# Patient Record
Sex: FEMALE | URBAN - METROPOLITAN AREA
[De-identification: names, ages, dates, MRNs, and addresses within clinical notes are randomized per-mention and may not be internally consistent; named-entity substitution may affect disease eponyms.]

---
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
## 2025-06-13 NOTE — PROCEDURE: MEDICATION COUNSELING
Thanks for visiting us today!    Remember these important phone numbers:    [phone] for phone nurses during the day and our nurse answering service at night    [phone]   for scheduling or changing future appointments    [phone] for the [facility]    When leaving a message for our staff, please include:   the spelling of your child’s full name and date of birth  your full name and relationship to child  best phone number and time to reach you   reason for the call      We strongly recommend that all children age 6 months and older receive the COVID-19 vaccine. Call our office at [phone]  to schedule.      Where's the best place on the internet to look up health information about kids?  HealthyChildren.org  From the [employer]        Is your child signed up for [employer]? If you do this, you can message us rather than playing phone tag! You can also look at labs, pay your bills, and do some scheduling. Go to your own account first. (If you don't have one yet, you can set one up at the website below or at your doctor's office).  Using a web browser (not the phone krissy), on the right hand side of your page, click the button marked \"Request Access to my Child's Records.\" Fill out the information. In a few days your child's information will be linked to your account. It's that simple!! Here is the website for more information:    [email or web address]        --------------------------------------------------------------------------------------------------------------------       Finasteride Counseling:  I discussed with the patient the risks of use of finasteride including but not limited to decreased libido, decreased ejaculate volume, gynecomastia, and depression. Women should not handle medication.  All of the patient's questions and concerns were addressed. Finasteride Male Counseling: Finasteride Counseling:  I discussed with the patient the risks of use of finasteride including but not limited to decreased libido, decreased ejaculate volume, gynecomastia, and depression. Women should not handle medication.  All of the patient's questions and concerns were addressed.